# Patient Record
Sex: FEMALE | Race: WHITE | Employment: FULL TIME | URBAN - METROPOLITAN AREA
[De-identification: names, ages, dates, MRNs, and addresses within clinical notes are randomized per-mention and may not be internally consistent; named-entity substitution may affect disease eponyms.]

---

## 2022-01-12 ENCOUNTER — OFFICE VISIT (OUTPATIENT)
Dept: OBGYN CLINIC | Facility: CLINIC | Age: 37
End: 2022-01-12
Payer: COMMERCIAL

## 2022-01-12 VITALS — SYSTOLIC BLOOD PRESSURE: 128 MMHG | DIASTOLIC BLOOD PRESSURE: 84 MMHG | WEIGHT: 194 LBS

## 2022-01-12 DIAGNOSIS — Z01.419 WELL WOMAN EXAM WITH ROUTINE GYNECOLOGICAL EXAM: Primary | ICD-10-CM

## 2022-01-12 PROCEDURE — 99385 PREV VISIT NEW AGE 18-39: CPT | Performed by: OBSTETRICS & GYNECOLOGY

## 2022-01-12 PROCEDURE — 3079F DIAST BP 80-89 MM HG: CPT | Performed by: OBSTETRICS & GYNECOLOGY

## 2022-01-12 PROCEDURE — 3074F SYST BP LT 130 MM HG: CPT | Performed by: OBSTETRICS & GYNECOLOGY

## 2022-01-12 RX ORDER — SUMATRIPTAN 100 MG/1
100 TABLET, FILM COATED ORAL
COMMUNITY

## 2022-01-12 RX ORDER — BACLOFEN 10 MG/1
TABLET ORAL
COMMUNITY

## 2022-01-12 NOTE — PROGRESS NOTES
HPI:   Ce Doan is a 31year old female who presents for an annual/pap. Pt also going to try for pregnancy with next cycle. preconceptional counseling done.       Wt Readings from Last 6 Encounters:  01/12/22 : 194 lb (88 kg)    There is no height tenderness  :introitus is normal,scant discharge,cervix is pink,no adnexal masses or tenderness, PAP was done     MUSCULOSKELETAL: back is not tender,FROM of the back  EXTREMITIES: no cyanosis, clubbing or edema  NEURO: Oriented times three      ASSESSME

## 2022-01-13 LAB — HPV I/H RISK 1 DNA SPEC QL NAA+PROBE: NEGATIVE

## 2022-01-21 LAB
LAST PAP RESULT: NORMAL
PAP HISTORY (OTHER THAN LAST PAP): NORMAL

## 2022-01-24 ENCOUNTER — TELEPHONE (OUTPATIENT)
Dept: OBGYN CLINIC | Facility: CLINIC | Age: 37
End: 2022-01-24

## 2022-01-24 NOTE — TELEPHONE ENCOUNTER
[employer] message sent to pt.    ----- Message from Alan Benjamin MD sent at 1/24/2022 12:38 PM CST -----  Normal pap, neg hpv

## 2022-03-28 ENCOUNTER — OFFICE VISIT (OUTPATIENT)
Dept: INTERNAL MEDICINE CLINIC | Facility: CLINIC | Age: 37
End: 2022-03-28
Payer: COMMERCIAL

## 2022-03-28 VITALS
BODY MASS INDEX: 31.66 KG/M2 | DIASTOLIC BLOOD PRESSURE: 89 MMHG | SYSTOLIC BLOOD PRESSURE: 133 MMHG | HEIGHT: 66 IN | WEIGHT: 197 LBS | HEART RATE: 87 BPM

## 2022-03-28 DIAGNOSIS — Z00.00 PHYSICAL EXAM, ANNUAL: Primary | ICD-10-CM

## 2022-03-28 PROCEDURE — 3075F SYST BP GE 130 - 139MM HG: CPT | Performed by: INTERNAL MEDICINE

## 2022-03-28 PROCEDURE — 3079F DIAST BP 80-89 MM HG: CPT | Performed by: INTERNAL MEDICINE

## 2022-03-28 PROCEDURE — 99385 PREV VISIT NEW AGE 18-39: CPT | Performed by: INTERNAL MEDICINE

## 2022-03-28 PROCEDURE — 3008F BODY MASS INDEX DOCD: CPT | Performed by: INTERNAL MEDICINE

## 2022-03-28 RX ORDER — ONDANSETRON HYDROCHLORIDE 8 MG/1
8 TABLET, FILM COATED ORAL 3 TIMES DAILY PRN
COMMUNITY
Start: 2021-11-13

## 2023-02-09 ENCOUNTER — OFFICE VISIT (OUTPATIENT)
Dept: OBGYN CLINIC | Facility: CLINIC | Age: 38
End: 2023-02-09

## 2023-02-09 VITALS
BODY MASS INDEX: 32.43 KG/M2 | SYSTOLIC BLOOD PRESSURE: 128 MMHG | HEIGHT: 66 IN | HEART RATE: 91 BPM | DIASTOLIC BLOOD PRESSURE: 82 MMHG | WEIGHT: 201.81 LBS

## 2023-02-09 DIAGNOSIS — Z01.419 WELL WOMAN EXAM WITH ROUTINE GYNECOLOGICAL EXAM: Primary | ICD-10-CM

## 2023-02-09 DIAGNOSIS — Z31.9 PATIENT DESIRES PREGNANCY: ICD-10-CM

## 2023-02-09 DIAGNOSIS — N97.9 FEMALE INFERTILITY: ICD-10-CM

## 2023-02-09 PROCEDURE — 99395 PREV VISIT EST AGE 18-39: CPT | Performed by: NURSE PRACTITIONER

## 2023-02-09 PROCEDURE — 3074F SYST BP LT 130 MM HG: CPT | Performed by: NURSE PRACTITIONER

## 2023-02-09 PROCEDURE — 3008F BODY MASS INDEX DOCD: CPT | Performed by: NURSE PRACTITIONER

## 2023-02-09 PROCEDURE — 3079F DIAST BP 80-89 MM HG: CPT | Performed by: NURSE PRACTITIONER

## 2023-02-26 ENCOUNTER — LAB ENCOUNTER (OUTPATIENT)
Dept: LAB | Facility: HOSPITAL | Age: 38
End: 2023-02-26
Attending: INTERNAL MEDICINE
Payer: COMMERCIAL

## 2023-02-26 DIAGNOSIS — Z00.00 PHYSICAL EXAM, ANNUAL: ICD-10-CM

## 2023-02-26 DIAGNOSIS — N97.9 FEMALE INFERTILITY: ICD-10-CM

## 2023-02-26 LAB
ALBUMIN SERPL-MCNC: 3.6 G/DL (ref 3.4–5)
ALBUMIN/GLOB SERPL: 1 {RATIO} (ref 1–2)
ALP LIVER SERPL-CCNC: 80 U/L
ALT SERPL-CCNC: 25 U/L
ANION GAP SERPL CALC-SCNC: 4 MMOL/L (ref 0–18)
AST SERPL-CCNC: 17 U/L (ref 15–37)
BILIRUB SERPL-MCNC: 0.5 MG/DL (ref 0.1–2)
BUN BLD-MCNC: 10 MG/DL (ref 7–18)
BUN/CREAT SERPL: 15.9 (ref 10–20)
CALCIUM BLD-MCNC: 8.7 MG/DL (ref 8.5–10.1)
CHLORIDE SERPL-SCNC: 111 MMOL/L (ref 98–112)
CHOLEST SERPL-MCNC: 231 MG/DL (ref ?–200)
CO2 SERPL-SCNC: 26 MMOL/L (ref 21–32)
CREAT BLD-MCNC: 0.63 MG/DL
DEPRECATED RDW RBC AUTO: 37.7 FL (ref 35.1–46.3)
ERYTHROCYTE [DISTWIDTH] IN BLOOD BY AUTOMATED COUNT: 11.8 % (ref 11–15)
EST. AVERAGE GLUCOSE BLD GHB EST-MCNC: 103 MG/DL (ref 68–126)
ESTRADIOL SERPL-MCNC: 32.3 PG/ML
FASTING PATIENT LIPID ANSWER: YES
FASTING STATUS PATIENT QL REPORTED: YES
FSH SERPL-ACNC: 7 MIU/ML
GFR SERPLBLD BASED ON 1.73 SQ M-ARVRAT: 119 ML/MIN/1.73M2 (ref 60–?)
GLOBULIN PLAS-MCNC: 3.6 G/DL (ref 2.8–4.4)
GLUCOSE BLD-MCNC: 99 MG/DL (ref 70–99)
HBA1C MFR BLD: 5.2 % (ref ?–5.7)
HCT VFR BLD AUTO: 40.5 %
HDLC SERPL-MCNC: 49 MG/DL (ref 40–59)
HGB BLD-MCNC: 13.4 G/DL
LDLC SERPL CALC-MCNC: 146 MG/DL (ref ?–100)
LH SERPL-ACNC: 6.7 MIU/ML
MCH RBC QN AUTO: 29.4 PG (ref 26–34)
MCHC RBC AUTO-ENTMCNC: 33.1 G/DL (ref 31–37)
MCV RBC AUTO: 88.8 FL
NONHDLC SERPL-MCNC: 182 MG/DL (ref ?–130)
OSMOLALITY SERPL CALC.SUM OF ELEC: 291 MOSM/KG (ref 275–295)
PLATELET # BLD AUTO: 259 10(3)UL (ref 150–450)
POTASSIUM SERPL-SCNC: 4.1 MMOL/L (ref 3.5–5.1)
PROLACTIN SERPL-MCNC: 12.1 NG/ML
PROT SERPL-MCNC: 7.2 G/DL (ref 6.4–8.2)
RBC # BLD AUTO: 4.56 X10(6)UL
SODIUM SERPL-SCNC: 141 MMOL/L (ref 136–145)
TRIGL SERPL-MCNC: 198 MG/DL (ref 30–149)
TSI SER-ACNC: 1.26 MIU/ML (ref 0.36–3.74)
VLDLC SERPL CALC-MCNC: 38 MG/DL (ref 0–30)
WBC # BLD AUTO: 7.1 X10(3) UL (ref 4–11)

## 2023-02-26 PROCEDURE — 83036 HEMOGLOBIN GLYCOSYLATED A1C: CPT

## 2023-02-26 PROCEDURE — 85027 COMPLETE CBC AUTOMATED: CPT

## 2023-02-26 PROCEDURE — 80053 COMPREHEN METABOLIC PANEL: CPT

## 2023-02-26 PROCEDURE — 83002 ASSAY OF GONADOTROPIN (LH): CPT

## 2023-02-26 PROCEDURE — 82670 ASSAY OF TOTAL ESTRADIOL: CPT

## 2023-02-26 PROCEDURE — 83520 IMMUNOASSAY QUANT NOS NONAB: CPT

## 2023-02-26 PROCEDURE — 84146 ASSAY OF PROLACTIN: CPT

## 2023-02-26 PROCEDURE — 80061 LIPID PANEL: CPT

## 2023-02-26 PROCEDURE — 84443 ASSAY THYROID STIM HORMONE: CPT

## 2023-02-26 PROCEDURE — 83001 ASSAY OF GONADOTROPIN (FSH): CPT

## 2023-02-26 PROCEDURE — 36415 COLL VENOUS BLD VENIPUNCTURE: CPT

## 2023-03-02 LAB — ANTI-MULLERIAN HORMONE: 3.14 NG/ML

## 2024-01-30 ENCOUNTER — OFFICE VISIT (OUTPATIENT)
Dept: INTERNAL MEDICINE CLINIC | Facility: CLINIC | Age: 39
End: 2024-01-30
Payer: COMMERCIAL

## 2024-01-30 VITALS
DIASTOLIC BLOOD PRESSURE: 75 MMHG | HEART RATE: 94 BPM | SYSTOLIC BLOOD PRESSURE: 125 MMHG | WEIGHT: 210.19 LBS | BODY MASS INDEX: 33.78 KG/M2 | HEIGHT: 66 IN

## 2024-01-30 DIAGNOSIS — R59.9 ENLARGED LYMPH NODES: Primary | ICD-10-CM

## 2024-01-30 PROCEDURE — 3008F BODY MASS INDEX DOCD: CPT | Performed by: NURSE PRACTITIONER

## 2024-01-30 PROCEDURE — 99213 OFFICE O/P EST LOW 20 MIN: CPT | Performed by: NURSE PRACTITIONER

## 2024-01-30 PROCEDURE — 3074F SYST BP LT 130 MM HG: CPT | Performed by: NURSE PRACTITIONER

## 2024-01-30 PROCEDURE — 3078F DIAST BP <80 MM HG: CPT | Performed by: NURSE PRACTITIONER

## 2024-01-30 NOTE — PROGRESS NOTES
HPI:    Patient ID: Eleni Henry is a 38 year old female.    HPI Enlarged lymph nodes.- She first noticed the nodes under both axilla area. The nodes last for 2 to 3 days then they go away.(October).  She was given progestrone, estrogen and Lupron.    In November she noticed  enlarged lymph nodes again. It lasted two to three days and went away..    January 23 to 26 noticed enlarged lymph nodes.    She had a IVF transfer October 26 and December 2nd.  She develop endometritis and was given doxycyline.  Doxycyline for two weeks.  She had a repeat uterine biopsy today.    Recently had all her labs drawn and will send the results.  I reviewed her CBC on her phone.    Immunization History   Administered Date(s) Administered    Covid-19 Vaccine Moderna 100 mcg/0.5 ml 02/04/2021, 03/04/2021, 12/18/2021    DTAP INFANRIX 06/03/2015    FLUZONE 6 months and older PFS 0.5 ml (35849) 10/09/2018, 09/19/2020    Fluvirin, 3 Years & >, Im 11/05/2016, 10/13/2021    Influenza 10/17/2017    TDAP 10/13/2021       Past Medical History:   Diagnosis Date    Migraines 2012      Past Surgical History:   Procedure Laterality Date    Other surgical history      wisdom teeth       Social History     Socioeconomic History    Marital status:    Tobacco Use    Smoking status: Never    Smokeless tobacco: Never   Vaping Use    Vaping Use: Never used   Substance and Sexual Activity    Alcohol use: Yes     Comment: Socially     Drug use: Never    Sexual activity: Yes          Review of Systems   Constitutional:  Negative for chills, fatigue and fever.   HENT:  Negative for congestion, ear discharge, ear pain, facial swelling, hearing loss, postnasal drip, sinus pressure, sore throat and trouble swallowing.    Eyes:  Negative for pain, discharge, redness and visual disturbance.   Respiratory:  Negative for cough, chest tightness, shortness of breath and wheezing.    Cardiovascular:  Negative for chest pain, palpitations and leg swelling.    Gastrointestinal:  Negative for abdominal distention, abdominal pain, constipation, diarrhea, nausea and vomiting.   Endocrine: Negative for cold intolerance, heat intolerance, polydipsia, polyphagia and polyuria.   Genitourinary:  Negative for difficulty urinating, dysuria, pelvic pain and vaginal bleeding.   Musculoskeletal:  Negative for back pain, gait problem, neck pain and neck stiffness.   Skin:  Negative for color change and rash.   Neurological:  Negative for dizziness, seizures, weakness and headaches.   Psychiatric/Behavioral:  Negative for agitation and sleep disturbance. The patient is not nervous/anxious.               Current Outpatient Medications   Medication Sig Dispense Refill    ondansetron (ZOFRAN) 8 MG tablet Take 1 tablet (8 mg total) by mouth 3 (three) times daily as needed.      SUMAtriptan Succinate 100 MG Oral Tab Take 1 tablet (100 mg total) by mouth.      baclofen 10 MG Oral Tab Take by mouth as needed.       Allergies:No Known Allergies   PHYSICAL EXAM:   Physical Exam  Constitutional:       Appearance: Normal appearance. She is well-developed.   HENT:      Head: Normocephalic.      Right Ear: Tympanic membrane normal.      Left Ear: Tympanic membrane normal.      Nose: Nose normal.      Mouth/Throat:      Mouth: Mucous membranes are moist.      Pharynx: No oropharyngeal exudate or posterior oropharyngeal erythema.   Eyes:      General:         Right eye: No discharge.         Left eye: No discharge.      Pupils: Pupils are equal, round, and reactive to light.   Neck:      Comments: No cervical, axillary or femoral lymph nodes noted  Cardiovascular:      Rate and Rhythm: Normal rate and regular rhythm.      Heart sounds: Normal heart sounds. No murmur heard.     No friction rub. No gallop.   Pulmonary:      Effort: Pulmonary effort is normal. No respiratory distress.      Breath sounds: Normal breath sounds. No wheezing, rhonchi or rales.   Abdominal:      General: Bowel sounds are  normal. There is no distension.      Palpations: Abdomen is soft. There is no mass.      Tenderness: There is no abdominal tenderness. There is no right CVA tenderness, left CVA tenderness or guarding.   Musculoskeletal:         General: No tenderness.      Cervical back: Normal range of motion and neck supple. No tenderness.      Right lower leg: No edema.      Left lower leg: No edema.   Lymphadenopathy:      Cervical: No cervical adenopathy.   Skin:     General: Skin is warm and dry.      Findings: No rash.   Neurological:      Mental Status: She is alert and oriented to person, place, and time.      Coordination: Coordination normal.      Gait: Gait normal.   Psychiatric:         Mood and Affect: Mood normal.         Behavior: Behavior normal.         Thought Content: Thought content normal.         Judgment: Judgment normal.       /75 (BP Location: Left arm, Patient Position: Sitting, Cuff Size: large)   Pulse 94   Ht 5' 6\" (1.676 m)   Wt 210 lb 3.2 oz (95.3 kg)   LMP 01/25/2023 (Exact Date)   BMI 33.93 kg/m²   Wt Readings from Last 2 Encounters:   01/30/24 210 lb 3.2 oz (95.3 kg)   02/09/23 201 lb 12.8 oz (91.5 kg)     Body mass index is 33.93 kg/m².(2)  Lab Results   Component Value Date    WBC 7.1 02/26/2023    RBC 4.56 02/26/2023    HGB 13.4 02/26/2023    HCT 40.5 02/26/2023    MCV 88.8 02/26/2023    MCH 29.4 02/26/2023    MCHC 33.1 02/26/2023    RDW 11.8 02/26/2023    .0 02/26/2023      Lab Results   Component Value Date    GLU 99 02/26/2023    BUN 10 02/26/2023    BUNCREA 15.9 02/26/2023    CREATSERUM 0.63 02/26/2023    ANIONGAP 4 02/26/2023    CA 8.7 02/26/2023    OSMOCALC 291 02/26/2023    ALKPHO 80 02/26/2023    AST 17 02/26/2023    ALT 25 02/26/2023    BILT 0.5 02/26/2023    TP 7.2 02/26/2023    ALB 3.6 02/26/2023    GLOBULIN 3.6 02/26/2023     02/26/2023    K 4.1 02/26/2023     02/26/2023    CO2 26.0 02/26/2023      Lab Results   Component Value Date      02/26/2023    A1C 5.2 02/26/2023      Lab Results   Component Value Date    CHOLEST 231 (H) 02/26/2023    TRIG 198 (H) 02/26/2023    HDL 49 02/26/2023     (H) 02/26/2023    VLDL 38 (H) 02/26/2023    NONHDLC 182 (H) 02/26/2023      Lab Results   Component Value Date    TSH 1.260 02/26/2023                ASSESSMENT/PLAN:     Problem List Items Addressed This Visit       Enlarged lymph nodes - Primary     Patient having intermittent enlarged lymph nodes in her axillary area.  No nodes palpated.  Patient engaging in IVF  Recent endometritis    Plan  Continue to monitor for enlarged lymph nodes           Patient will send her recent lab work  To notify us if she palpates an enlarged lymph node that does no decrease in size.    No orders of the defined types were placed in this encounter.      Meds This Visit:  Requested Prescriptions      No prescriptions requested or ordered in this encounter       Imaging & Referrals:  None         MARKO Mccauley

## 2024-01-31 NOTE — ASSESSMENT & PLAN NOTE
Patient having intermittent enlarged lymph nodes in her axillary area.  No nodes palpated.  Patient engaging in IVF  Recent endometritis    Plan  Continue to monitor for enlarged lymph nodes

## 2024-02-13 ENCOUNTER — OFFICE VISIT (OUTPATIENT)
Dept: OTOLARYNGOLOGY | Facility: CLINIC | Age: 39
End: 2024-02-13
Payer: COMMERCIAL

## 2024-02-13 DIAGNOSIS — H65.92 FLUID LEVEL BEHIND TYMPANIC MEMBRANE OF LEFT EAR: ICD-10-CM

## 2024-02-13 DIAGNOSIS — H69.90 EUSTACHIAN TUBE DISORDER, UNSPECIFIED LATERALITY: Primary | ICD-10-CM

## 2024-02-13 PROCEDURE — 99203 OFFICE O/P NEW LOW 30 MIN: CPT | Performed by: STUDENT IN AN ORGANIZED HEALTH CARE EDUCATION/TRAINING PROGRAM

## 2024-02-13 PROCEDURE — 92567 TYMPANOMETRY: CPT | Performed by: STUDENT IN AN ORGANIZED HEALTH CARE EDUCATION/TRAINING PROGRAM

## 2024-02-13 PROCEDURE — 92504 EAR MICROSCOPY EXAMINATION: CPT | Performed by: STUDENT IN AN ORGANIZED HEALTH CARE EDUCATION/TRAINING PROGRAM

## 2024-02-13 RX ORDER — METRONIDAZOLE 500 MG/1
500 TABLET ORAL 2 TIMES DAILY
COMMUNITY
Start: 2024-02-06

## 2024-02-13 RX ORDER — CHORIOGONADOTROPIN ALFA 250 UG/.5ML
INJECTION, SOLUTION SUBCUTANEOUS
COMMUNITY
Start: 2024-02-06

## 2024-02-13 NOTE — PROGRESS NOTES
Eleni Henry is a 38 year old female.   Chief Complaint   Patient presents with    Ear Problem     C/o left ear congestion X 1 week        ASSESSMENT AND PLAN:   1. Eustachian tube disorder, unspecified laterality  38-year-old presents with a clogged left ear.  She had an upper respiratory infection about 1 to 2 weeks ago.  She had achiness in her ears treated with an oral antibiotic.  The congestion in her ear continues    On exam she has an effusion on the left, slight erythema of the tympanic membrane with a flat tympanogram.  Normal on the right    Discussed that she has an effusion on the left and that flying would be medically contraindicated.  She is planning to go to [place] in 2 days.  Do not think this could be treated medically in that short period of time.  She is going to think about whether she wants to return tomorrow morning for a myringotomy or reschedule her trip.  Otherwise discussed Sudafed, Flonase and oral steroids.    - Audiology Exam    2. Fluid level behind tympanic membrane of left ear        The patient indicates understanding of these issues and agrees to the plan.      EXAM:   There were no vitals taken for this visit.    Pertinent exam findings may also be noted above in assessment and plan     System Details   Skin Inspection - Normal.   Constitutional Overall appearance - Normal.   Head/Face Symmetric, TMJ tenderness not present    Eyes EOMI, PERRL   Right ear:  Canal clear, TM intact, no NINA   Left ear:  Canal clear, TM intact, no NINA   Nose: Septum midline, inferior turbinates not enlarged, nasal valves without collapse    Oral cavity/Oropharynx: No lesions or masses on inspection or palpation, tonsils symmetric    Neck: Soft without LAD, thyroid not enlarged  Voice clear/ no stridor   Other:      Scopes and Procedures:             Current Outpatient Medications   Medication Sig Dispense Refill    OVIDREL 250 MCG/0.5ML Subcutaneous Injection       metRONIDAZOLE 500 MG Oral Tab  Take 1 tablet (500 mg total) by mouth 2 (two) times daily.      ondansetron (ZOFRAN) 8 MG tablet Take 1 tablet (8 mg total) by mouth 3 (three) times daily as needed.      SUMAtriptan Succinate 100 MG Oral Tab Take 1 tablet (100 mg total) by mouth.      baclofen 10 MG Oral Tab Take by mouth as needed.        Past Medical History:   Diagnosis Date    Migraines 2012      Social History:  Social History     Socioeconomic History    Marital status:    Tobacco Use    Smoking status: Never    Smokeless tobacco: Never   Vaping Use    Vaping Use: Never used   Substance and Sexual Activity    Alcohol use: Yes     Comment: Socially     Drug use: Never    Sexual activity: Yes          Julien Bain MD  2/13/2024  4:28 PM

## 2024-02-14 ENCOUNTER — OFFICE VISIT (OUTPATIENT)
Dept: OTOLARYNGOLOGY | Facility: CLINIC | Age: 39
End: 2024-02-14

## 2024-02-14 DIAGNOSIS — H69.90 EUSTACHIAN TUBE DISORDER, UNSPECIFIED LATERALITY: Primary | ICD-10-CM

## 2024-02-14 PROCEDURE — 69433 CREATE EARDRUM OPENING: CPT | Performed by: STUDENT IN AN ORGANIZED HEALTH CARE EDUCATION/TRAINING PROGRAM

## 2024-02-14 RX ORDER — DOXYCYCLINE HYCLATE 100 MG/1
100 CAPSULE ORAL 2 TIMES DAILY
COMMUNITY
Start: 2024-01-04

## 2024-02-14 RX ORDER — LEUPROLIDE ACETATE 1 MG/0.2ML
KIT SUBCUTANEOUS
COMMUNITY
Start: 2023-09-13

## 2024-02-14 RX ORDER — FLUCONAZOLE 150 MG/1
TABLET ORAL
COMMUNITY
Start: 2024-02-13

## 2024-02-14 RX ORDER — AMOXICILLIN AND CLAVULANATE POTASSIUM 875; 125 MG/1; MG/1
1 TABLET, FILM COATED ORAL 2 TIMES DAILY
COMMUNITY
Start: 2024-02-05

## 2024-02-14 NOTE — PROGRESS NOTES
Eleni Henry is a 38 year old female.   Chief Complaint   Patient presents with    Follow - Up     Patient is here for left ear f/u.       ASSESSMENT AND PLAN:   1. Eustachian tube disorder, unspecified laterality  38-year-old presents for a left ear myringotomy.  She was seen yesterday with an effusion on the left with a flat tympanogram.  She is going to be flying tomorrow to [place].    On exam she did have an air bubble and small amount of fluid on the left.  Myringotomy was performed.  Small amount of effusion was evacuated.  Postprocedure precautions discussed.  Discussed this will still take another several weeks to normalize.  She can return back as needed.      The patient indicates understanding of these issues and agrees to the plan.      EXAM:   There were no vitals taken for this visit.    Pertinent exam findings may also be noted above in assessment and plan     System Details   Skin Inspection - Normal.   Constitutional Overall appearance - Normal.   Head/Face Symmetric, TMJ tenderness not present    Eyes EOMI, PERRL   Right ear:  Canal clear, TM intact, no NINA   Left ear:  Canal clear, TM intact, no NINA   Nose: Septum midline, inferior turbinates not enlarged, nasal valves without collapse    Oral cavity/Oropharynx: No lesions or masses on inspection or palpation, tonsils symmetric    Neck: Soft without LAD, thyroid not enlarged  Voice clear/ no stridor   Other:      Scopes and Procedures:     Procedures:  Myringotomy/Tympanostomy:  Pre-Procedure Care: Consent was obtained. Procedure/risks were explained. Questions were answered. Correct patient identified. Correct side and site confirmed.   A myringotomy was performed in the pb-inferior quadrant of the left tympanic membrane.  Anesthetic used was Phenol placed topically. Patient tolerated procedure well.        Current Outpatient Medications   Medication Sig Dispense Refill    amoxicillin clavulanate 875-125 MG Oral Tab Take 1 tablet by  mouth 2 (two) times daily.      doxycycline 100 MG Oral Cap Take 1 capsule (100 mg total) by mouth 2 (two) times daily.      fluconazole 150 MG Oral Tab       leuprolide 1 mg/0.2mL Injection Kit       OVIDREL 250 MCG/0.5ML Subcutaneous Injection       metRONIDAZOLE 500 MG Oral Tab Take 1 tablet (500 mg total) by mouth 2 (two) times daily.      ondansetron (ZOFRAN) 8 MG tablet Take 1 tablet (8 mg total) by mouth 3 (three) times daily as needed.      SUMAtriptan Succinate 100 MG Oral Tab Take 1 tablet (100 mg total) by mouth.      baclofen 10 MG Oral Tab Take by mouth as needed.        Past Medical History:   Diagnosis Date    Migraines 2012      Social History:  Social History     Socioeconomic History    Marital status:    Tobacco Use    Smoking status: Never    Smokeless tobacco: Never   Vaping Use    Vaping Use: Never used   Substance and Sexual Activity    Alcohol use: Yes     Comment: Socially     Drug use: Never    Sexual activity: Yes          Julien Bain MD  2/14/2024  10:43 AM

## 2024-03-21 ENCOUNTER — OFFICE VISIT (OUTPATIENT)
Dept: GASTROENTEROLOGY | Facility: CLINIC | Age: 39
End: 2024-03-21
Payer: COMMERCIAL

## 2024-03-21 VITALS
HEIGHT: 66 IN | DIASTOLIC BLOOD PRESSURE: 78 MMHG | BODY MASS INDEX: 33.11 KG/M2 | HEART RATE: 76 BPM | WEIGHT: 206 LBS | SYSTOLIC BLOOD PRESSURE: 135 MMHG

## 2024-03-21 DIAGNOSIS — K60.2 ANAL FISSURE: Primary | ICD-10-CM

## 2024-03-21 PROCEDURE — 99244 OFF/OP CNSLTJ NEW/EST MOD 40: CPT | Performed by: INTERNAL MEDICINE

## 2024-03-21 PROCEDURE — 3008F BODY MASS INDEX DOCD: CPT | Performed by: INTERNAL MEDICINE

## 2024-03-21 PROCEDURE — 3075F SYST BP GE 130 - 139MM HG: CPT | Performed by: INTERNAL MEDICINE

## 2024-03-21 PROCEDURE — 3078F DIAST BP <80 MM HG: CPT | Performed by: INTERNAL MEDICINE

## 2024-03-21 NOTE — PATIENT INSTRUCTIONS
PLAN    - Please start sitz baths in warm water 20 minutes twice a day (if possible)    - Take Benefiber powder 1-2 scoops daily to keep the stools soft and regular    - Avoid straining    - If your OBGYN team is OK with it, take Nifedipine ointment (0.3%) twice a day    - Do all of the above for 6 weeks    - Follow up in ~6 weeks

## 2024-03-21 NOTE — H&P
[facility] - Gastroenterology                                                                                                               Reason for consult: anal pain and bleeding    Requesting physician or provider: JAIDA YU    Chief Complaint   Patient presents with    Other     Possible fissure per pt       HPI:   Eleni Henry is a 38 year old year-old female here for the following:    Pt is undergoing IVF and had back to back rounds of antibiotics about a month ago. She initially developed diarrhea, then didn't have a BM for a few days, which is atypical for her, and then had more constipation than her baseline after which she developed anal pain and bleeding.  She has been moving her bowels pretty regularly, and they are generally soft. Pt has been trying to avoid straining as well. She has tried Aquaphor to the anal region and other OTC therapies for fissures and hemorrhoids, and while sometimes it seems like the pain and bleeding is dissipating, she again develops rectal bleeding with BMs and anal pain with defecation which is quite uncomfortable.  She doesn't have anal pain if she isn't trying to have a BM.       Denies abdominal pain, f/c, weight loss, nausea, vomiting, or any other symptoms.      Denies family h/o CRC or IBD.    Prior endoscopies:  None.     Soc:  -denies smoking  -denies heavy Etoh  -no illicit drug use    Wt Readings from Last 6 Encounters:   03/21/24 206 lb (93.4 kg)   01/30/24 210 lb 3.2 oz (95.3 kg)   02/09/23 201 lb 12.8 oz (91.5 kg)   03/28/22 197 lb (89.4 kg)   01/12/22 194 lb (88 kg)        History, Medications, Allergies, ROS:      Past Medical History:   Diagnosis Date    Migraines 2012      Past Surgical History:   Procedure Laterality Date    OTHER SURGICAL HISTORY      wisdom teeth       Family Hx:   Family History   Problem Relation Age of Onset    Cancer Mother         Skin  cancer    Other (down syndrom) Sister       Social History:   Social History     Socioeconomic History    Marital status:    Tobacco Use    Smoking status: Never    Smokeless tobacco: Never   Vaping Use    Vaping Use: Never used   Substance and Sexual Activity    Alcohol use: Yes     Comment: Socially     Drug use: Never    Sexual activity: Yes        Medications (Active prior to today's visit):  Current Outpatient Medications   Medication Sig Dispense Refill    fluconazole 150 MG Oral Tab       leuprolide 1 mg/0.2mL Injection Kit       OVIDREL 250 MCG/0.5ML Subcutaneous Injection       metRONIDAZOLE 500 MG Oral Tab Take 1 tablet (500 mg total) by mouth 2 (two) times daily.      ondansetron (ZOFRAN) 8 MG tablet Take 1 tablet (8 mg total) by mouth 3 (three) times daily as needed.      SUMAtriptan Succinate 100 MG Oral Tab Take 1 tablet (100 mg total) by mouth.      baclofen 10 MG Oral Tab Take by mouth as needed.      amoxicillin clavulanate 875-125 MG Oral Tab Take 1 tablet by mouth 2 (two) times daily. (Patient not taking: Reported on 3/21/2024)      doxycycline 100 MG Oral Cap Take 1 capsule (100 mg total) by mouth 2 (two) times daily. (Patient not taking: Reported on 3/21/2024)         Allergies:  No Known Allergies    ROS:   CONSTITUTIONAL:  negative for fevers, rigors  EYES:  negative for diplopia   RESPIRATORY:  negative for severe shortness of breath  CARDIOVASCULAR:  negative for crushing sub-sternal chest pain  GASTROINTESTINAL:  see HPI  GENITOURINARY:  negative for dysuria or gross hematuria  INTEGUMENT/BREAST:  SKIN:  negative for jaundice   ALLERGIC/IMMUNOLOGIC:  negative for hay fever  ENDOCRINE:  negative for cold intolerance and heat intolerance  MUSCULOSKELETAL:  negative for joint effusion/severe erythema  BEHAVIOR/PSYCH:  negative for psychotic behavior      PHYSICAL EXAM:   Blood pressure 135/78, pulse 76, height 5' 6\" (1.676 m), weight 206 lb (93.4 kg).    GEN - Patient appears  comfortable and in no acute discomfort  ENT - MMM  EYES - the sclera appears anicteric  CV - no edema  RESP -  No increased work of breathing  ABDOMEN - soft, non-tender exam in all quadrants without rigidity or guarding, non-distended, no abnormal bowel sounds noted, no masses are palpated  RECTAL - posterior anal fissure noted. KASSIDY deferred.   SKIN - No jaundice  NEURO - Alert and appropriate, and gross movements of extremities normal  PSYCH - normal affect, non-agitated    Labs/Imaging:     Patient's pertinent labs and imaging were reviewed and discussed with patient today.      .  ASSESSMENT/PLAN:     38 F here for the following:    Anal pain and bleeding 2/2 anal fissure - noted on rectal exam today.  Etiology is likely due to the change in bowel habits from the antibiotics and resultant constipation that has started to improved without intervention over the past month.  Since IVF is ongoing and a retrieval is planned in about 10 days, I asked her check with her OBGYN if OK to give nifedipine ointment for her fissure.  If not, can still try conservative measures by keeping the stools soft, sitz baths BID, and avoiding straining for 4-6 weeks. If symptoms continue, will plan for endoscopic eval and surgical referral.     Recommend     - Start sitz baths in warm water 20 minutes twice a day (if possible)    - Take Benefiber powder 1-2 scoops daily to keep the stools soft and regular    - Avoid straining    - If the OBGYN team is OK Nifedipine ointment (0.3%) twice a day, she will let me know so I can sent it to a specialty pharmacy     - Recommend doing all of the above for 6 weeks    - Follow up in ~6 weeks    [facility] [place] [facility] info is below:  [address] [place] [address]  Closes at 8 PM  [phone]    Orders This Visit:  No orders of the defined types were placed in this encounter.      Meds This Visit:  Requested Prescriptions      No prescriptions requested or ordered in this encounter        Imaging & Referrals:  None         Kristen Herron MD          This note was partially prepared using Dragon [employer] voice recognition dictation software. As a result, errors may occur. When identified, these errors have been corrected. While every attempt is made to correct errors during dictation, discrepancies may still exist.

## 2024-03-22 ENCOUNTER — PATIENT MESSAGE (OUTPATIENT)
Dept: GASTROENTEROLOGY | Facility: CLINIC | Age: 39
End: 2024-03-22

## 2024-03-22 NOTE — TELEPHONE ENCOUNTER
From: Eleni Henry  To: Kristen Herron  Sent: 3/22/2024 12:01 PM CDT  Subject: Nifedipine Follow up     Hi there - my IVF team has stated that I can use the topical cream until day of transfer. Let me know if you need anything from me to get that prescription started. Thanks!

## 2024-03-22 NOTE — TELEPHONE ENCOUNTER
Called pharmacy below and gave phone order for Nifedipine per last OV plan: If your OBGYN team is OK with it, take Nifedipine ointment (0.3%) twice a day  - Do all of the above for 6 weeks    [facility] [place] [facility] info is below:  [address] [place] [address]  Closes at 8 PM  [phone]     Spoke w/ pharmacist Yoel, pt's name//med disclosed.     Pharmacist stated pt's cost will be $65.     Notified pt above via [employer] message sent.

## (undated) NOTE — MR AVS SNAPSHOT
After Visit Summary   1/12/2022    Elizabeth Laird   MRN: [id number]           Visit Information     Date & Time  1/12/2022  5:40 PM Provider  Carolyn Langston MD [facility], [facility], [address] [place] [address] Dept.  Phone  [phone]      [id number]  Expires: 2/26/2022  4:40 PM    4. Enter your Zip Code and Date of Birth (mm/dd/yyyy) as indicated and click Next. You will be taken to the next sign-up page. 5. Create a [employer] Username.  This will be your [employer] login Username and cannot be

## (undated) NOTE — LETTER
AUTHORIZATION FOR SURGICAL OPERATION OR OTHER PROCEDURE    1. I hereby authorize Dr. Julien Bain , and [facility] staff assigned to my case to perform the following operation and/or procedure at the [facility]:    _______________________________________________________________________________________________    Left ear myringotomy   _______________________________________________________________________________________________    2.  My physician has explained the nature and purpose of the operation or other procedure, possible alternative methods of treatment, the risks involved, and the possibility of complication to me.  I acknowledge that no guarantee has been made as to the result that may be obtained.  3.  I recognize that, during the course of this operation, or other procedure, unforseen conditions may necessitate additional or different procedure than those listed above.  I, therefore, further authorize and request that the above named physician, his/her physician assistants or designees perform such procedures as are, in his/her professional opinion, necessary and desirable.  4.  Any tissue or organs removed in the operation or other procedure may be disposed of by and at the discretion of the [facility] and [facility].  5.  I understand that in the event of a medical emergency, I will be transported by local paramedics to [facility] or other hospital emergency department.  6.  I certify that I have read and fully understand the above consent to operation and/or other procedure.    7.  I acknowledge that my physician has explained sedation/analgesia administration to me including the risks and benefits.  I consent to the administration of sedation/analgesia as may be necessary or desirable in the judgement of my physician.    Witness signature: ___________________________________________________ Date:  ______/______/_____                    Time:   ________ A.M.  P.M.       Patient Name:  ______________________________________________________  (please print)      Patient signature:  ___________________________________________________             Relationship to Patient:           []  Parent    Responsible person                          []  Spouse  In case of minor or                    [] Other  _____________   Incompetent name:  __________________________________________________                               (please print)      _____________      Responsible person  In case of minor or  Incompetent signature:  _______________________________________________    Statement of Physician  My signature below affirms that prior to the time of the procedure, I have explained to the patient and/or his/her guardian, the risks and benefits involved in the proposed treatment and any reasonable alternative to the proposed treatment.  I have also explained the risks and benefits involved in the refusal of the proposed treatment and have answered the patient's questions.                        Date:  ______/______/_______  Provider                      Signature:  __________________________________________________________       Time:  ___________ A.M    P.M.